# Patient Record
Sex: FEMALE | Race: WHITE | Employment: OTHER | URBAN - NONMETROPOLITAN AREA
[De-identification: names, ages, dates, MRNs, and addresses within clinical notes are randomized per-mention and may not be internally consistent; named-entity substitution may affect disease eponyms.]

---

## 2019-03-09 ENCOUNTER — HOSPITAL ENCOUNTER (EMERGENCY)
Age: 64
Discharge: HOME | End: 2019-03-09
Payer: COMMERCIAL

## 2019-03-09 VITALS
RESPIRATION RATE: 16 BRPM | HEART RATE: 84 BPM | DIASTOLIC BLOOD PRESSURE: 86 MMHG | HEIGHT: 62 IN | OXYGEN SATURATION: 95 % | BODY MASS INDEX: 21.16 KG/M2 | SYSTOLIC BLOOD PRESSURE: 151 MMHG | WEIGHT: 115 LBS | TEMPERATURE: 98.8 F

## 2019-03-09 DIAGNOSIS — J01.00 ACUTE MAXILLARY SINUSITIS, RECURRENCE NOT SPECIFIED: Primary | ICD-10-CM

## 2019-03-09 DIAGNOSIS — J01.10 ACUTE FRONTAL SINUSITIS, RECURRENCE NOT SPECIFIED: ICD-10-CM

## 2019-03-09 PROCEDURE — 99203 OFFICE O/P NEW LOW 30 MIN: CPT | Performed by: NURSE PRACTITIONER

## 2019-03-09 PROCEDURE — 99213 OFFICE O/P EST LOW 20 MIN: CPT

## 2019-03-09 RX ORDER — PREDNISONE 20 MG/1
20 TABLET ORAL 2 TIMES DAILY
Qty: 10 TABLET | Refills: 0 | Status: SHIPPED | OUTPATIENT
Start: 2019-03-09 | End: 2019-03-14

## 2019-03-09 RX ORDER — OXYCODONE HYDROCHLORIDE 5 MG/1
5 CAPSULE ORAL EVERY 4 HOURS PRN
COMMUNITY

## 2019-03-09 RX ORDER — AMOXICILLIN AND CLAVULANATE POTASSIUM 875; 125 MG/1; MG/1
1 TABLET, FILM COATED ORAL 2 TIMES DAILY
Qty: 14 TABLET | Refills: 0 | Status: SHIPPED | OUTPATIENT
Start: 2019-03-09 | End: 2019-03-16

## 2019-03-09 ASSESSMENT — PAIN DESCRIPTION - PAIN TYPE: TYPE: ACUTE PAIN

## 2019-03-09 ASSESSMENT — PAIN DESCRIPTION - PROGRESSION: CLINICAL_PROGRESSION: GRADUALLY WORSENING

## 2019-03-09 ASSESSMENT — ENCOUNTER SYMPTOMS
SORE THROAT: 1
SINUS PAIN: 1
DIARRHEA: 0
SHORTNESS OF BREATH: 0
CHEST TIGHTNESS: 0
SINUS PRESSURE: 1
NAUSEA: 0
COUGH: 1
VOMITING: 0
RHINORRHEA: 1

## 2019-03-09 ASSESSMENT — PAIN - FUNCTIONAL ASSESSMENT: PAIN_FUNCTIONAL_ASSESSMENT: ACTIVITIES ARE NOT PREVENTED

## 2019-03-09 ASSESSMENT — PAIN DESCRIPTION - FREQUENCY: FREQUENCY: CONTINUOUS

## 2019-03-09 ASSESSMENT — PAIN DESCRIPTION - LOCATION: LOCATION: GENERALIZED

## 2019-03-09 ASSESSMENT — PAIN DESCRIPTION - ONSET: ONSET: AWAKENED FROM SLEEP

## 2019-03-09 ASSESSMENT — PAIN SCALES - GENERAL: PAINLEVEL_OUTOF10: 5

## 2019-03-09 ASSESSMENT — PAIN DESCRIPTION - DESCRIPTORS: DESCRIPTORS: ACHING

## 2020-09-23 ENCOUNTER — NURSE ONLY (OUTPATIENT)
Dept: LAB | Age: 65
End: 2020-09-23

## 2023-06-06 ENCOUNTER — HOSPITAL ENCOUNTER (OUTPATIENT)
Dept: CT IMAGING | Age: 68
Discharge: HOME | End: 2023-06-06
Attending: FAMILY MEDICINE
Payer: MEDICARE

## 2023-06-06 DIAGNOSIS — M54.00 PANNICULITIS AFFECTING REGIONS OF NECK AND BACK, SITE UNSPECIFIED: ICD-10-CM

## 2023-06-06 PROCEDURE — 72131 CT LUMBAR SPINE W/O DYE: CPT

## 2025-02-24 ENCOUNTER — TRANSCRIBE ORDERS (OUTPATIENT)
Dept: ADMINISTRATIVE | Age: 70
End: 2025-02-24

## 2025-02-24 DIAGNOSIS — Z13.820 SCREENING FOR OSTEOPOROSIS: ICD-10-CM

## 2025-02-24 DIAGNOSIS — Z78.0 POSTMENOPAUSAL: Primary | ICD-10-CM

## 2025-07-14 ENCOUNTER — HOSPITAL ENCOUNTER (EMERGENCY)
Age: 70
Discharge: HOME | End: 2025-07-14
Payer: MEDICARE

## 2025-07-14 ENCOUNTER — APPOINTMENT (OUTPATIENT)
Dept: GENERAL RADIOLOGY | Age: 70
End: 2025-07-14
Payer: MEDICARE

## 2025-07-14 VITALS
DIASTOLIC BLOOD PRESSURE: 88 MMHG | SYSTOLIC BLOOD PRESSURE: 190 MMHG | TEMPERATURE: 98 F | BODY MASS INDEX: 17.66 KG/M2 | WEIGHT: 96 LBS | RESPIRATION RATE: 14 BRPM | HEART RATE: 61 BPM | OXYGEN SATURATION: 98 % | HEIGHT: 62 IN

## 2025-07-14 DIAGNOSIS — I10 HYPERTENSION, UNSPECIFIED TYPE: Primary | ICD-10-CM

## 2025-07-14 LAB
ALBUMIN SERPL BCG-MCNC: 4 G/DL (ref 3.4–4.9)
ALP SERPL-CCNC: 137 U/L (ref 38–126)
ALT SERPL W/O P-5'-P-CCNC: 27 U/L (ref 10–35)
ANION GAP SERPL CALC-SCNC: 11 MEQ/L (ref 8–16)
AST SERPL-CCNC: 26 U/L (ref 10–35)
BACTERIA URNS QL MICRO: ABNORMAL /HPF
BASOPHILS ABSOLUTE: 0 THOU/MM3 (ref 0–0.1)
BASOPHILS NFR BLD AUTO: 0.5 %
BILIRUB SERPL-MCNC: 0.4 MG/DL (ref 0.3–1.2)
BILIRUB UR QL STRIP.AUTO: NEGATIVE
BUN SERPL-MCNC: 20 MG/DL (ref 8–23)
CALCIUM SERPL-MCNC: 9.2 MG/DL (ref 8.8–10.2)
CASTS #/AREA URNS LPF: ABNORMAL /LPF
CASTS 2: ABNORMAL /LPF
CHARACTER UR: CLEAR
CHLORIDE SERPL-SCNC: 106 MEQ/L (ref 98–111)
CO2 SERPL-SCNC: 25 MEQ/L (ref 22–29)
COLOR, UA: YELLOW
CREAT SERPL-MCNC: 0.8 MG/DL (ref 0.5–0.9)
CRYSTALS URNS MICRO: ABNORMAL
DEPRECATED RDW RBC AUTO: 50.3 FL (ref 35–45)
EKG ATRIAL RATE: 59 BPM
EKG P AXIS: 69 DEGREES
EKG P-R INTERVAL: 166 MS
EKG Q-T INTERVAL: 414 MS
EKG QRS DURATION: 90 MS
EKG QTC CALCULATION (BAZETT): 409 MS
EKG R AXIS: 84 DEGREES
EKG T AXIS: 86 DEGREES
EKG VENTRICULAR RATE: 59 BPM
EOSINOPHIL NFR BLD AUTO: 1.8 %
EOSINOPHILS ABSOLUTE: 0.1 THOU/MM3 (ref 0–0.4)
EPITHELIAL CELLS, UA: ABNORMAL /HPF
ERYTHROCYTE [DISTWIDTH] IN BLOOD BY AUTOMATED COUNT: 14.4 % (ref 11.5–14.5)
GFR SERPL CREATININE-BSD FRML MDRD: 80 ML/MIN/1.73M2
GLUCOSE SERPL-MCNC: 116 MG/DL (ref 74–109)
GLUCOSE UR QL STRIP.AUTO: NEGATIVE MG/DL
HCT VFR BLD AUTO: 44.7 % (ref 37–47)
HGB BLD-MCNC: 14.9 GM/DL (ref 12–16)
HGB UR QL STRIP.AUTO: ABNORMAL
IMM GRANULOCYTES # BLD AUTO: 0.02 THOU/MM3 (ref 0–0.07)
IMM GRANULOCYTES NFR BLD AUTO: 0.3 %
KETONES UR QL STRIP.AUTO: NEGATIVE
LYMPHOCYTES ABSOLUTE: 1.6 THOU/MM3 (ref 1–4.8)
LYMPHOCYTES NFR BLD AUTO: 20.5 %
MCH RBC QN AUTO: 31.4 PG (ref 26–33)
MCHC RBC AUTO-ENTMCNC: 33.3 GM/DL (ref 32.2–35.5)
MCV RBC AUTO: 94.1 FL (ref 81–99)
MISCELLANEOUS 2: ABNORMAL
MONOCYTES ABSOLUTE: 0.5 THOU/MM3 (ref 0.4–1.3)
MONOCYTES NFR BLD AUTO: 6.7 %
NEUTROPHILS ABSOLUTE: 5.5 THOU/MM3 (ref 1.8–7.7)
NEUTROPHILS NFR BLD AUTO: 70.2 %
NITRITE UR QL STRIP: NEGATIVE
NRBC BLD AUTO-RTO: 0 /100 WBC
NT-PROBNP SERPL IA-MCNC: 969 PG/ML (ref 0–124)
OSMOLALITY SERPL CALC.SUM OF ELEC: 286.7 MOSMOL/KG (ref 275–300)
PH UR STRIP.AUTO: 6.5 [PH] (ref 5–9)
PLATELET # BLD AUTO: 253 THOU/MM3 (ref 130–400)
PMV BLD AUTO: 10 FL (ref 9.4–12.4)
POTASSIUM SERPL-SCNC: 3.9 MEQ/L (ref 3.5–5.2)
PROT SERPL-MCNC: 6.5 G/DL (ref 6.4–8.3)
PROT UR STRIP.AUTO-MCNC: NEGATIVE MG/DL
RBC # BLD AUTO: 4.75 MILL/MM3 (ref 4.2–5.4)
RBC URINE: ABNORMAL /HPF
RENAL EPI CELLS #/AREA URNS HPF: ABNORMAL /[HPF]
SODIUM SERPL-SCNC: 142 MEQ/L (ref 135–145)
SP GR UR REFRACT.AUTO: 1 (ref 1–1.03)
TROPONIN, HIGH SENSITIVITY: 7 NG/L (ref 0–12)
UROBILINOGEN, URINE: 0.2 EU/DL (ref 0–1)
WBC # BLD AUTO: 7.8 THOU/MM3 (ref 4.8–10.8)
WBC #/AREA URNS HPF: ABNORMAL /HPF
WBC #/AREA URNS HPF: ABNORMAL /[HPF]
YEAST LIKE FUNGI URNS QL MICRO: ABNORMAL

## 2025-07-14 PROCEDURE — 84484 ASSAY OF TROPONIN QUANT: CPT

## 2025-07-14 PROCEDURE — 93010 ELECTROCARDIOGRAM REPORT: CPT | Performed by: NUCLEAR MEDICINE

## 2025-07-14 PROCEDURE — 80053 COMPREHEN METABOLIC PANEL: CPT

## 2025-07-14 PROCEDURE — 99285 EMERGENCY DEPT VISIT HI MDM: CPT

## 2025-07-14 PROCEDURE — 93005 ELECTROCARDIOGRAM TRACING: CPT

## 2025-07-14 PROCEDURE — 83880 ASSAY OF NATRIURETIC PEPTIDE: CPT

## 2025-07-14 PROCEDURE — 85025 COMPLETE CBC W/AUTO DIFF WBC: CPT

## 2025-07-14 PROCEDURE — 81001 URINALYSIS AUTO W/SCOPE: CPT

## 2025-07-14 PROCEDURE — 71046 X-RAY EXAM CHEST 2 VIEWS: CPT

## 2025-07-14 PROCEDURE — 6370000000 HC RX 637 (ALT 250 FOR IP)

## 2025-07-14 PROCEDURE — 36415 COLL VENOUS BLD VENIPUNCTURE: CPT

## 2025-07-14 RX ORDER — HYDRALAZINE HYDROCHLORIDE 10 MG/1
10 TABLET, FILM COATED ORAL ONCE
Status: COMPLETED | OUTPATIENT
Start: 2025-07-14 | End: 2025-07-14

## 2025-07-14 RX ORDER — AMLODIPINE BESYLATE 5 MG/1
5 TABLET ORAL DAILY
Status: DISCONTINUED | OUTPATIENT
Start: 2025-07-14 | End: 2025-07-14 | Stop reason: HOSPADM

## 2025-07-14 RX ADMIN — HYDRALAZINE HYDROCHLORIDE 10 MG: 10 TABLET ORAL at 10:49

## 2025-07-14 RX ADMIN — AMLODIPINE BESYLATE 5 MG: 5 TABLET ORAL at 10:49

## 2025-07-14 ASSESSMENT — VISUAL ACUITY
OU: 20/30
OS: 20/30
OD: 20/30

## 2025-07-14 ASSESSMENT — PAIN - FUNCTIONAL ASSESSMENT
PAIN_FUNCTIONAL_ASSESSMENT: NONE - DENIES PAIN
PAIN_FUNCTIONAL_ASSESSMENT: NONE - DENIES PAIN

## 2025-07-14 NOTE — ED NOTES
Performed patient visual acuity at this time. Patient states she had her [employer] glasses on when performing acuity. Patient acuity 20/30 bilateral, 20/30 right eye, 20/30 left eye. Pt ambulated to bathroom at this time to give urine sample.

## 2025-07-14 NOTE — ED NOTES
Pt resting in cot with visitor at bedside. Pt up to bathroom with steady gait. Respirations even and unlabored.

## 2025-07-14 NOTE — ED PROVIDER NOTES
[facility] EMERGENCY DEPARTMENT      EMERGENCY MEDICINE     Pt Name: Christine Driscoll  MRN: [id number]  Birthdate 1955  Date of evaluation: 2025  Provider: Max Crum PA-C    CHIEF COMPLAINT       Chief Complaint   Patient presents with    Hypertension     HISTORY OF PRESENT ILLNESS   Christine Driscoll is a pleasant 69 y.o. female who presents to the emergency department from from home, as a walk in to the ED lobby for evaluation of hypertension.    Patient endorses hypertension x 1 week.  She is currently taking atenolol.  She states that she took her atenolol this morning but when she went for her pain management clinic they sent her to the ED due to her high blood pressure.  Patient states she has a history of migraines and her headaches are not worse than usual.  She states she is able to walk but from time to time feels off balance.  Patient denies fever chills, nausea vomiting, chest pain, difficulty breathing.  She does not endorse acute changes in vision but stated there was an episode of blurriness a few days ago.    PASTMEDICAL HISTORY     Past Medical History:   Diagnosis Date    Arthritis        There is no problem list on file for this patient.    SURGICAL HISTORY       Past Surgical History:   Procedure Laterality Date    BACK SURGERY         CURRENT MEDICATIONS       Previous Medications    BUTALBITAL-ACETAMINOPHEN-CAFFEINE (FIORICET) PER TABLET    Take 1 tablet by mouth every 4 hours as needed for Pain.    OXYCODONE 5 MG CAPSULE    Take 5 mg by mouth every 4 hours as needed for Pain.       ALLERGIES     is allergic to gabapentin, gluten meal, mobic [meloxicam], sulfa antibiotics, ultram [tramadol hcl], and flexeril [cyclobenzaprine].    FAMILY HISTORY     She indicated that her mother is . She indicated that her father is alive.       SOCIAL HISTORY       Social History     Tobacco Use    Smoking status: Every Day     Current packs/day: 0.50     Types: Cigarettes    Smokeless

## 2025-07-14 NOTE — ED TRIAGE NOTES
Presents to ED with c/o hypertension that started last week. Denies pain. Alert and oriented. Respirations easy and unlabored.